# Patient Record
Sex: FEMALE | Race: WHITE | NOT HISPANIC OR LATINO | Employment: FULL TIME | URBAN - METROPOLITAN AREA
[De-identification: names, ages, dates, MRNs, and addresses within clinical notes are randomized per-mention and may not be internally consistent; named-entity substitution may affect disease eponyms.]

---

## 2020-06-24 ENCOUNTER — COMMUNICATION - HEALTHEAST (OUTPATIENT)
Dept: SCHEDULING | Facility: CLINIC | Age: 55
End: 2020-06-24

## 2020-06-29 ENCOUNTER — OFFICE VISIT - HEALTHEAST (OUTPATIENT)
Dept: FAMILY MEDICINE | Facility: CLINIC | Age: 55
End: 2020-06-29

## 2020-06-29 DIAGNOSIS — H00.014 HORDEOLUM EXTERNUM OF LEFT UPPER EYELID: ICD-10-CM

## 2020-06-29 RX ORDER — CETIRIZINE HYDROCHLORIDE 10 MG/1
10 TABLET ORAL DAILY
Status: SHIPPED | COMMUNITY
Start: 2020-06-29

## 2020-06-29 ASSESSMENT — MIFFLIN-ST. JEOR: SCORE: 1313.67

## 2021-06-04 VITALS
WEIGHT: 158.7 LBS | RESPIRATION RATE: 16 BRPM | DIASTOLIC BLOOD PRESSURE: 64 MMHG | HEART RATE: 60 BPM | TEMPERATURE: 98.2 F | BODY MASS INDEX: 25.51 KG/M2 | SYSTOLIC BLOOD PRESSURE: 102 MMHG | HEIGHT: 66 IN

## 2021-06-09 NOTE — PROGRESS NOTES
"Assessment/Plan:        1. Hordeolum externum of left upper eyelid  Exam findings were discussed   Plan:     - tobramycin (TOBREX) 0.3 % ophthalmic solution; Administer 1 drop into the left eye every 4 (four) hours for 10 days.  Dispense: 5 mL; Refill: 0   Use as directed   Consider  Ambulatory referral to Ophthalmology if not better in a week or sooner as anticipated.     At the conclusion of the encounter the plan of care, disposition and all questions were answered and reviewed, and the patient acknowledged understanding and was involved in the decision making regarding the overall care plan.           Subjective:    Patient ID:   Lori Casiano is a 55 y.o. female presenting with a lump/ stye on her left upper lid for the past week, not improving on its own.  She has tried the warm compresses without much improvement.           Review of Systems  Allergy: reviewed  General : negative  A complete 5 point review of systems was obtained and is negative other than what is stated in the HPI.      The following patient's history were reviewed and updated as appropriate:   She  has no past medical history on file..      Outpatient Encounter Medications as of 6/29/2020   Medication Sig Dispense Refill     calcium carbonate (OS-ESCOBAR) 600 mg calcium (1,500 mg) tablet Take 600 mg by mouth 2 (two) times a day with meals.       cetirizine (ZYRTEC) 10 MG tablet Take 10 mg by mouth daily.       No facility-administered encounter medications on file as of 6/29/2020.          Objective:   /64   Pulse 60   Temp 98.2  F (36.8  C) (Oral)   Resp 16   Ht 5' 5.5\" (1.664 m)   Wt 158 lb 11.2 oz (72 kg)   BMI 26.01 kg/m        Physical Exam  Gen: NAD, well hydrated   Right eye: normal   Left eye: +upper lid stye temporal area, normal conjunctivae.   "

## 2021-08-21 ENCOUNTER — HEALTH MAINTENANCE LETTER (OUTPATIENT)
Age: 56
End: 2021-08-21

## 2021-10-16 ENCOUNTER — HEALTH MAINTENANCE LETTER (OUTPATIENT)
Age: 56
End: 2021-10-16

## 2022-10-01 ENCOUNTER — HEALTH MAINTENANCE LETTER (OUTPATIENT)
Age: 57
End: 2022-10-01

## 2023-10-15 ENCOUNTER — HEALTH MAINTENANCE LETTER (OUTPATIENT)
Age: 58
End: 2023-10-15